# Patient Record
Sex: MALE | Race: BLACK OR AFRICAN AMERICAN | NOT HISPANIC OR LATINO | URBAN - METROPOLITAN AREA
[De-identification: names, ages, dates, MRNs, and addresses within clinical notes are randomized per-mention and may not be internally consistent; named-entity substitution may affect disease eponyms.]

---

## 2024-10-14 ENCOUNTER — LAB OUTSIDE AN ENCOUNTER (OUTPATIENT)
Dept: URBAN - METROPOLITAN AREA CLINIC 25 | Facility: CLINIC | Age: 54
End: 2024-10-14

## 2024-10-14 ENCOUNTER — TELEPHONE ENCOUNTER (OUTPATIENT)
Dept: URBAN - METROPOLITAN AREA CLINIC 25 | Facility: CLINIC | Age: 54
End: 2024-10-14

## 2024-10-14 ENCOUNTER — OFFICE VISIT (OUTPATIENT)
Dept: URBAN - METROPOLITAN AREA CLINIC 25 | Facility: CLINIC | Age: 54
End: 2024-10-14
Payer: COMMERCIAL

## 2024-10-14 ENCOUNTER — DASHBOARD ENCOUNTERS (OUTPATIENT)
Age: 54
End: 2024-10-14

## 2024-10-14 VITALS
TEMPERATURE: 97.4 F | HEIGHT: 71 IN | WEIGHT: 278.8 LBS | BODY MASS INDEX: 39.03 KG/M2 | HEART RATE: 82 BPM | DIASTOLIC BLOOD PRESSURE: 75 MMHG | SYSTOLIC BLOOD PRESSURE: 145 MMHG

## 2024-10-14 DIAGNOSIS — R74.8 ELEVATED LIVER ENZYMES: ICD-10-CM

## 2024-10-14 DIAGNOSIS — M54.9 MID BACK PAIN: ICD-10-CM

## 2024-10-14 DIAGNOSIS — R10.11 RIGHT UPPER QUADRANT ABDOMINAL PAIN: ICD-10-CM

## 2024-10-14 PROCEDURE — 99204 OFFICE O/P NEW MOD 45 MIN: CPT | Performed by: INTERNAL MEDICINE

## 2024-10-14 NOTE — HPI-TODAY'S VISIT:
Oct 2024: labs done with pcp - he went for b/l back and flank pain, headache, weakness, nausea X 3 weeks.  9/30/2024 - normal cbc, ALT elevated 78, otherwise normal , normal afp.  he took a 3 month couse of antifungal prescribed by podiatrist.  he also saw urologist and underwent a CT scan ( [facility]). he takes TUMS prn when he has reflux symptoms. he very rarely has reflux symptoms.  pain is sometimes in right upper flank / abdomen area.

## 2024-10-16 ENCOUNTER — TELEPHONE ENCOUNTER (OUTPATIENT)
Dept: URBAN - METROPOLITAN AREA CLINIC 25 | Facility: CLINIC | Age: 54
End: 2024-10-16

## 2024-10-18 ENCOUNTER — TELEPHONE ENCOUNTER (OUTPATIENT)
Dept: URBAN - METROPOLITAN AREA CLINIC 25 | Facility: CLINIC | Age: 54
End: 2024-10-18

## 2024-10-20 ENCOUNTER — WEB ENCOUNTER (OUTPATIENT)
Dept: URBAN - METROPOLITAN AREA CLINIC 25 | Facility: CLINIC | Age: 54
End: 2024-10-20

## 2024-10-21 ENCOUNTER — WEB ENCOUNTER (OUTPATIENT)
Dept: URBAN - METROPOLITAN AREA CLINIC 25 | Facility: CLINIC | Age: 54
End: 2024-10-21

## 2024-10-22 ENCOUNTER — WEB ENCOUNTER (OUTPATIENT)
Dept: URBAN - METROPOLITAN AREA CLINIC 25 | Facility: CLINIC | Age: 54
End: 2024-10-22

## 2024-10-23 ENCOUNTER — WEB ENCOUNTER (OUTPATIENT)
Dept: URBAN - METROPOLITAN AREA CLINIC 25 | Facility: CLINIC | Age: 54
End: 2024-10-23

## 2024-10-23 LAB
ALBUMIN/GLOBULIN RATIO: 1.9
ALBUMIN: 4.7
ALKALINE PHOSPHATASE: 66
ALT (SGPT): 45
AST (SGOT): 25
BILIRUBIN, DIRECT: 0.1
BILIRUBIN, INDIRECT: 0.4
BILIRUBIN, TOTAL: 0.5
GLOBULIN: 2.5
HEPATITIS B CORE AB TOTAL: (no result)
HEPATITIS B SURFACE AB IMMUNITY, QN: <5
HEPATITIS B SURFACE ANTIGEN: (no result)
HEPATITIS C ANTIBODY: (no result)
PROTEIN, TOTAL: 7.2

## 2024-10-24 ENCOUNTER — WEB ENCOUNTER (OUTPATIENT)
Dept: URBAN - METROPOLITAN AREA CLINIC 25 | Facility: CLINIC | Age: 54
End: 2024-10-24

## 2024-12-11 ENCOUNTER — OFFICE VISIT (OUTPATIENT)
Dept: URBAN - METROPOLITAN AREA CLINIC 25 | Facility: CLINIC | Age: 54
End: 2024-12-11
Payer: COMMERCIAL

## 2024-12-11 ENCOUNTER — TELEPHONE ENCOUNTER (OUTPATIENT)
Dept: URBAN - METROPOLITAN AREA CLINIC 25 | Facility: CLINIC | Age: 54
End: 2024-12-11

## 2024-12-11 VITALS
SYSTOLIC BLOOD PRESSURE: 125 MMHG | DIASTOLIC BLOOD PRESSURE: 75 MMHG | TEMPERATURE: 97.9 F | WEIGHT: 278.8 LBS | BODY MASS INDEX: 39.03 KG/M2 | HEIGHT: 71 IN | HEART RATE: 75 BPM

## 2024-12-11 DIAGNOSIS — R74.8 ELEVATED LIVER ENZYMES: ICD-10-CM

## 2024-12-11 DIAGNOSIS — R10.11 RIGHT UPPER QUADRANT ABDOMINAL PAIN: ICD-10-CM

## 2024-12-11 DIAGNOSIS — R12 HEARTBURN: ICD-10-CM

## 2024-12-11 PROCEDURE — 99214 OFFICE O/P EST MOD 30 MIN: CPT

## 2024-12-11 RX ORDER — OMEPRAZOLE 20 MG/1
1 CAPSULE 30 MINUTES BEFORE MORNING MEAL CAPSULE, DELAYED RELEASE ORAL ONCE A DAY
Qty: 90 | Refills: 1 | OUTPATIENT
Start: 2024-12-11

## 2024-12-11 RX ORDER — CALCIUM CARB/VITAMIN D3/VIT K1 500-500-40
AS DIRECTED TABLET,CHEWABLE ORAL
Status: ACTIVE | COMMUNITY

## 2024-12-11 NOTE — HPI-TODAY'S VISIT:
12/24 OV  Admits to upper abdominal disocmfort, tums prn, notes increased abdominal gas/burping, mild heartburn, occurs once a week, better w/ tums. The patient has not been on a PPI, the patient is concerned w/ fatty liver, labs indicate elevated ALT, and repeat labs show improvement of alt to 68. Abdominal discomfort is intermittent, denies associated N/V, no unintentional weigth loss, SOB/CP

## 2025-01-07 ENCOUNTER — TELEPHONE ENCOUNTER (OUTPATIENT)
Dept: URBAN - METROPOLITAN AREA CLINIC 86 | Facility: CLINIC | Age: 55
End: 2025-01-07

## 2025-01-07 NOTE — HPI-TODAY'S VISIT:
This is a 54-year-old male, referred by Dr. Marquis Arnold, for evaluation of elevated liver enzymes and fatty liver.  A copy of the note will be sent to the referring provider. Past medical history includes hypertension, blood elevated liver enzymes, prediabetes.  He also has headaches and is seeing a neurologist. He is listed as taking amlodipine, atorvastatin, losartan hydrochlorothiazide 100-25 mg, metformin 500 mg, Viagra 100 mg, tamsulosin 0.4 mg. September 2024 labs with sodium 139, potassium 3.9, creatinine 1.04, alkaline phosphatase 63, AST 37 and ALT 78.  White blood cells 5.8, hemoglobin 14.7, MCV 91 and platelets 228.  A1c was listed 6. He had repeat labs and they were improving but I do not have those results. He had an ultrasound done in November of this year and it showed fatty liver.  They did not see lesions.

## 2025-01-08 ENCOUNTER — OFFICE VISIT (OUTPATIENT)
Dept: URBAN - METROPOLITAN AREA CLINIC 86 | Facility: CLINIC | Age: 55
End: 2025-01-08
Payer: COMMERCIAL

## 2025-01-08 ENCOUNTER — LAB OUTSIDE AN ENCOUNTER (OUTPATIENT)
Dept: URBAN - METROPOLITAN AREA CLINIC 86 | Facility: CLINIC | Age: 55
End: 2025-01-08

## 2025-01-08 VITALS
BODY MASS INDEX: 39.06 KG/M2 | SYSTOLIC BLOOD PRESSURE: 160 MMHG | RESPIRATION RATE: 95 BRPM | HEIGHT: 71 IN | WEIGHT: 279 LBS | TEMPERATURE: 97.2 F | HEART RATE: 79 BPM | DIASTOLIC BLOOD PRESSURE: 82 MMHG

## 2025-01-08 DIAGNOSIS — Z71.89 VACCINE COUNSELING: ICD-10-CM

## 2025-01-08 DIAGNOSIS — K76.0 FATTY LIVER: ICD-10-CM

## 2025-01-08 DIAGNOSIS — R74.8 ELEVATED LIVER ENZYMES: ICD-10-CM

## 2025-01-08 DIAGNOSIS — R10.11 RIGHT UPPER QUADRANT ABDOMINAL PAIN: ICD-10-CM

## 2025-01-08 PROCEDURE — 99214 OFFICE O/P EST MOD 30 MIN: CPT | Performed by: PHYSICIAN ASSISTANT

## 2025-01-08 RX ORDER — CALCIUM CARB/VITAMIN D3/VIT K1 500-500-40
AS DIRECTED TABLET,CHEWABLE ORAL
Status: ACTIVE | COMMUNITY

## 2025-01-08 RX ORDER — OMEPRAZOLE 20 MG/1
1 CAPSULE 30 MINUTES BEFORE MORNING MEAL CAPSULE, DELAYED RELEASE ORAL ONCE A DAY
Qty: 90 | Refills: 1 | Status: ACTIVE | COMMUNITY
Start: 2024-12-11

## 2025-01-08 NOTE — HPI-TODAY'S VISIT:
This is a 54-year-old male, referred by Dr. Marquis Arnold, for evaluation of elevated liver enzymes and fatty liver.  A copy of the note will be sent to the referring provider.  Past medical history includes hypertension, blood elevated liver enzymes, prediabetes.  He also has headaches and is seeing a neurologist.  He is listed as taking amlodipine, atorvastatin, losartan hydrochlorothiazide 100-25 mg, metformin 500 mg, Viagra 100 mg, tamsulosin 0.4 mg, omeprazole 20 mg.   September 2024 labs with sodium 139, potassium 3.9, creatinine 1.04, alkaline phosphatase 63, AST 37 and ALT 78.  White blood cells 5.8, hemoglobin 14.7, MCV 91 and platelets 228.  A1c was listed 6.  He had repeat labs and they were improving but I do not have those results.  He had an ultrasound done in November of this year and it showed fatty liver.  They did not see lesions.  Asked about the enzymes and he noted that his labs have been elevated x 5 months. Asked about med changes and notes that he took a course of lamisil x 84 days. (12 weeks)   Asked about supplements. occasionally vitamins but not regularly  rec avoiding those [employer]  He notes pain in the back and thinks its his liver and some ruq pain   Dw patient the fatty lvier and lamisil does have risks and reviewed those   Lamisil liver tox:  HepatotoxicityDrug induced liver injury due to terbinafine was identified shortly after its introduction into medical use. Oral therapy with terbinafine is associated with elevations in serum aminotransferases in less than 1% of patients and the elevations are generally asymptomatic and resolve without stopping therapy. The estimated probability of developing elevated serum aminotransferase levels requiring stopping treatment is about 0.31% for 2 to 6 weeks' treatment and 0.44% for treatment longer than 8 weeks. Clinically apparent liver injury from terbinafine occurs rarely (1 in 50,000 to 120,000 prescriptions), but many case reports and even case series have been described in the literature. Liver injury usually arises within the first 6 weeks of therapy. The pattern of injury can be either hepatocellular or cholestatic initially, but typically evolves into a cholestatic pattern which can be prolonged (Cases 1 and 2). Some cases may progress to vanishing bile duct syndrome. Signs of hypersensitivity (rash, fever, eosinophilia) are not common and, when present, are generally mild-to-moderate in severity. Autoantibody formation is rare. In addition, cases with severe hepatocellular injury with acute liver failure have been described. These instances are marked by precipitous onset with marked elevations in serum aminotransferase levels and progressive jaundice and hepatic failure. Terbinafine has also been implicated in cases of Simpson-Glenn syndrome, in which case the hepatic injury may be overshadowed by rash and allergic symptoms. Likelihood score: B (highly likely cause of clinically apparent liver injury).

## 2025-01-09 ENCOUNTER — TELEPHONE ENCOUNTER (OUTPATIENT)
Dept: URBAN - METROPOLITAN AREA CLINIC 18 | Facility: CLINIC | Age: 55
End: 2025-01-09

## 2025-01-14 ENCOUNTER — LAB OUTSIDE AN ENCOUNTER (OUTPATIENT)
Dept: URBAN - METROPOLITAN AREA CLINIC 18 | Facility: CLINIC | Age: 55
End: 2025-01-14

## 2025-01-16 ENCOUNTER — TELEPHONE ENCOUNTER (OUTPATIENT)
Dept: URBAN - METROPOLITAN AREA CLINIC 86 | Facility: CLINIC | Age: 55
End: 2025-01-16

## 2025-01-16 NOTE — HPI-TODAY'S VISIT:
Dear Rj Nolan,  The labs were sent to me. Some of them are still pending and this is why they take longer to get to me from the lab.  The white blood cells 5.3, hemoglobin 15.6, MCV 94 and platelets 270.  Your monocytes are slightly elevated at 1.0 and this could be due to recent cold if you have been sick.  If not please share that with your primary care.  This does not have anything to do with the liver.  Glucose 97, creatinine 1.09, sodium 139 and potassium 4.3.  This is normal.  AST 34 and ALT 65.  Goal for the AST and ALT is less than 35.  Your iron studies were normal.  You are not immune to hepatitis B and you could discuss this vaccine with your primary care.  The autoimmune testing was negative.  You are not immune to hepatitis A and recommend discussing that vaccine with your primary care.  The alpha antitrypsin level was normal and your phenotype is still pending.  We can follow-up once we have the results available. Idalia Oliva PA-C

## 2025-01-17 ENCOUNTER — WEB ENCOUNTER (OUTPATIENT)
Dept: URBAN - METROPOLITAN AREA CLINIC 86 | Facility: CLINIC | Age: 55
End: 2025-01-17

## 2025-01-19 ENCOUNTER — WEB ENCOUNTER (OUTPATIENT)
Dept: URBAN - METROPOLITAN AREA CLINIC 86 | Facility: CLINIC | Age: 55
End: 2025-01-19

## 2025-01-21 ENCOUNTER — WEB ENCOUNTER (OUTPATIENT)
Dept: URBAN - METROPOLITAN AREA CLINIC 86 | Facility: CLINIC | Age: 55
End: 2025-01-21

## 2025-01-22 ENCOUNTER — WEB ENCOUNTER (OUTPATIENT)
Dept: URBAN - METROPOLITAN AREA CLINIC 86 | Facility: CLINIC | Age: 55
End: 2025-01-22

## 2025-01-22 LAB
ACTIN (SMOOTH MUSCLE) ANTIBODY: 6
ALBUMIN: 4.8
ALKALINE PHOSPHATASE: 79
ALPHA-1-ANTITRYPSIN, SERUM: 113
ALT (SGPT): 65
ANA DIRECT: NEGATIVE
AST (SGOT): 34
BASO (ABSOLUTE): 0
BASOS: 0
BILIRUBIN, TOTAL: 0.3
BUN/CREATININE RATIO: 14
BUN: 15
CALCIUM: 10.2
CARBON DIOXIDE, TOTAL: 27
CHLORIDE: 101
CREATININE: 1.09
EGFR: 80
EOS (ABSOLUTE): 0.1
EOS: 1
FERRITIN, SERUM: 199
GLOBULIN, TOTAL: 2.7
GLUCOSE: 97
HEMATOCRIT: 47.1
HEMATOLOGY COMMENTS:: (no result)
HEMOGLOBIN: 15.6
HEP A AB, TOTAL: NEGATIVE
HEPATITIS B SURF AB QUANT: <3.5
IMMATURE CELLS: (no result)
IMMATURE GRANS (ABS): 0
IMMATURE GRANULOCYTES: 0
IMMUNOGLOBULIN G, QN, SERUM: 1505
IMMUNOGLOBULIN M, QN, SERUM: 71
IRON BIND.CAP.(TIBC): 261
IRON SATURATION: 29
IRON: 76
LYMPHS (ABSOLUTE): 1.8
LYMPHS: 33
MCH: 31
MCHC: 33.1
MCV: 94
MITOCHONDRIAL (M2) ANTIBODY: <20
MONOCYTES(ABSOLUTE): 1
MONOCYTES: 18
NEUTROPHILS (ABSOLUTE): 2.5
NEUTROPHILS: 48
NRBC: (no result)
PHENOTYPE (PI): (no result)
PLATELETS: 270
POTASSIUM: 4.3
PROTEIN, TOTAL: 7.5
RBC: 5.03
RDW: 13.8
SODIUM: 139
UIBC: 185
WBC: 5.3

## 2025-01-23 ENCOUNTER — TELEPHONE ENCOUNTER (OUTPATIENT)
Dept: URBAN - METROPOLITAN AREA CLINIC 18 | Facility: CLINIC | Age: 55
End: 2025-01-23

## 2025-03-12 ENCOUNTER — OFFICE VISIT (OUTPATIENT)
Dept: URBAN - METROPOLITAN AREA CLINIC 25 | Facility: CLINIC | Age: 55
End: 2025-03-12

## 2025-03-26 ENCOUNTER — OFFICE VISIT (OUTPATIENT)
Dept: URBAN - METROPOLITAN AREA CLINIC 86 | Facility: CLINIC | Age: 55
End: 2025-03-26

## 2025-04-02 ENCOUNTER — OFFICE VISIT (OUTPATIENT)
Dept: URBAN - METROPOLITAN AREA CLINIC 25 | Facility: CLINIC | Age: 55
End: 2025-04-02

## 2025-04-22 ENCOUNTER — OFFICE VISIT (OUTPATIENT)
Dept: URBAN - METROPOLITAN AREA CLINIC 86 | Facility: CLINIC | Age: 55
End: 2025-04-22

## 2025-05-07 ENCOUNTER — OFFICE VISIT (OUTPATIENT)
Dept: URBAN - METROPOLITAN AREA CLINIC 86 | Facility: CLINIC | Age: 55
End: 2025-05-07

## 2025-05-11 ENCOUNTER — WEB ENCOUNTER (OUTPATIENT)
Dept: URBAN - METROPOLITAN AREA CLINIC 25 | Facility: CLINIC | Age: 55
End: 2025-05-11

## 2025-05-12 ENCOUNTER — TELEPHONE ENCOUNTER (OUTPATIENT)
Dept: URBAN - METROPOLITAN AREA CLINIC 18 | Facility: CLINIC | Age: 55
End: 2025-05-12

## 2025-05-12 ENCOUNTER — LAB OUTSIDE AN ENCOUNTER (OUTPATIENT)
Dept: URBAN - METROPOLITAN AREA CLINIC 18 | Facility: CLINIC | Age: 55
End: 2025-05-12

## 2025-05-12 ENCOUNTER — WEB ENCOUNTER (OUTPATIENT)
Dept: URBAN - METROPOLITAN AREA CLINIC 86 | Facility: CLINIC | Age: 55
End: 2025-05-12

## 2025-05-12 ENCOUNTER — WEB ENCOUNTER (OUTPATIENT)
Dept: URBAN - METROPOLITAN AREA CLINIC 25 | Facility: CLINIC | Age: 55
End: 2025-05-12

## 2025-05-16 ENCOUNTER — OFFICE VISIT (OUTPATIENT)
Dept: URBAN - METROPOLITAN AREA CLINIC 86 | Facility: CLINIC | Age: 55
End: 2025-05-16

## 2025-05-23 ENCOUNTER — TELEPHONE ENCOUNTER (OUTPATIENT)
Dept: URBAN - METROPOLITAN AREA CLINIC 25 | Facility: CLINIC | Age: 55
End: 2025-05-23

## 2025-07-14 ENCOUNTER — OFFICE VISIT (OUTPATIENT)
Dept: URBAN - METROPOLITAN AREA CLINIC 86 | Facility: CLINIC | Age: 55
End: 2025-07-14